# Patient Record
Sex: MALE | Race: BLACK OR AFRICAN AMERICAN | NOT HISPANIC OR LATINO | URBAN - METROPOLITAN AREA
[De-identification: names, ages, dates, MRNs, and addresses within clinical notes are randomized per-mention and may not be internally consistent; named-entity substitution may affect disease eponyms.]

---

## 2023-04-05 ENCOUNTER — HOSPITAL ENCOUNTER (EMERGENCY)
Facility: OTHER | Age: 23
Discharge: HOME | End: 2023-04-05
Attending: EMERGENCY MEDICINE
Payer: MEDICAID

## 2023-04-05 VITALS
SYSTOLIC BLOOD PRESSURE: 118 MMHG | HEART RATE: 85 BPM | RESPIRATION RATE: 16 BRPM | HEIGHT: 72 IN | WEIGHT: 225 LBS | DIASTOLIC BLOOD PRESSURE: 56 MMHG | TEMPERATURE: 99 F | BODY MASS INDEX: 30.48 KG/M2 | OXYGEN SATURATION: 98 %

## 2023-04-05 DIAGNOSIS — S39.012A STRAIN OF LUMBAR REGION, INITIAL ENCOUNTER: Primary | ICD-10-CM

## 2023-04-05 PROCEDURE — 99284 EMERGENCY DEPT VISIT MOD MDM: CPT

## 2023-04-05 PROCEDURE — 63600175 PHARM REV CODE 636 W HCPCS: Performed by: EMERGENCY MEDICINE

## 2023-04-05 PROCEDURE — 96372 THER/PROPH/DIAG INJ SC/IM: CPT | Performed by: EMERGENCY MEDICINE

## 2023-04-05 RX ORDER — NAPROXEN 500 MG/1
500 TABLET ORAL 2 TIMES DAILY WITH MEALS
Qty: 60 TABLET | Refills: 0 | Status: SHIPPED | OUTPATIENT
Start: 2023-04-05

## 2023-04-05 RX ORDER — KETOROLAC TROMETHAMINE 30 MG/ML
30 INJECTION, SOLUTION INTRAMUSCULAR; INTRAVENOUS
Status: COMPLETED | OUTPATIENT
Start: 2023-04-05 | End: 2023-04-05

## 2023-04-05 RX ORDER — ORPHENADRINE CITRATE 30 MG/ML
60 INJECTION INTRAMUSCULAR; INTRAVENOUS
Status: COMPLETED | OUTPATIENT
Start: 2023-04-05 | End: 2023-04-05

## 2023-04-05 RX ORDER — CYCLOBENZAPRINE HCL 10 MG
10 TABLET ORAL 3 TIMES DAILY PRN
Qty: 15 TABLET | Refills: 0 | Status: SHIPPED | OUTPATIENT
Start: 2023-04-05 | End: 2023-04-10

## 2023-04-05 RX ORDER — DEXAMETHASONE SODIUM PHOSPHATE 4 MG/ML
8 INJECTION, SOLUTION INTRA-ARTICULAR; INTRALESIONAL; INTRAMUSCULAR; INTRAVENOUS; SOFT TISSUE
Status: COMPLETED | OUTPATIENT
Start: 2023-04-05 | End: 2023-04-05

## 2023-04-05 RX ORDER — METHYLPREDNISOLONE 4 MG/1
TABLET ORAL
Qty: 1 TABLET | Refills: 0 | Status: SHIPPED | OUTPATIENT
Start: 2023-04-05 | End: 2023-04-26

## 2023-04-05 RX ADMIN — DEXAMETHASONE SODIUM PHOSPHATE 8 MG: 4 INJECTION, SOLUTION INTRA-ARTICULAR; INTRALESIONAL; INTRAMUSCULAR; INTRAVENOUS; SOFT TISSUE at 09:04

## 2023-04-05 RX ADMIN — KETOROLAC TROMETHAMINE 30 MG: 30 INJECTION, SOLUTION INTRAMUSCULAR; INTRAVENOUS at 09:04

## 2023-04-05 RX ADMIN — ORPHENADRINE CITRATE 60 MG: 30 INJECTION INTRAMUSCULAR; INTRAVENOUS at 09:04

## 2023-04-06 NOTE — ED PROVIDER NOTES
"Encounter Date: 4/5/2023       History     Chief Complaint   Patient presents with    Back Pain     C/o lower back pain after playing basketball, states "I think I twisted it the wrong way"     HPI    23-year-old male with no reported past medical history presenting lower back pain.  Patient was playing basketball earlier this afternoon, states that he was following and moved to the side felt an immediate pain and tightness in his lower back.  He reports since stopping earlier today his back has continued to worsen and become more and has had increased amount of pain.  He reports no numbness or tingling, states he can still walk but is having some difficulty, denies any bowel or bladder incontinence.  Reports having a history of lower back pain, states that he recently had some weight gain been less active than normally has been.  He also reports issues with the stretching said he is not very flexible.  He denies any further associated complaints.    Review of patient's allergies indicates:   Allergen Reactions    Shellfish containing products Swelling     History reviewed. No pertinent past medical history.  Past Surgical History:   Procedure Laterality Date    APPENDECTOMY      FOOT SURGERY      HAND SURGERY Right      History reviewed. No pertinent family history.  Social History     Tobacco Use    Smoking status: Never    Smokeless tobacco: Never   Substance Use Topics    Alcohol use: Yes     Review of Systems   Constitutional: Negative.    HENT: Negative.     Eyes: Negative.    Respiratory: Negative.     Cardiovascular: Negative.    Gastrointestinal: Negative.    Genitourinary: Negative.    Musculoskeletal:  Positive for back pain.   Skin: Negative.    Neurological: Negative.      Physical Exam     Initial Vitals [04/05/23 2109]   BP Pulse Resp Temp SpO2   (!) 118/56 85 16 99.2 °F (37.3 °C) 98 %      MAP       --         Physical Exam    Nursing note and vitals reviewed.  Constitutional: He appears " well-developed and well-nourished. He is not diaphoretic. No distress.   HENT:   Head: Normocephalic and atraumatic.   Eyes: Pupils are equal, round, and reactive to light. Right eye exhibits no discharge. Left eye exhibits no discharge.   Neck: No tracheal deviation present.   Normal range of motion.  Cardiovascular:  Normal rate and regular rhythm.           Pulmonary/Chest: No stridor. No respiratory distress.   Musculoskeletal:         General: Tenderness (ttp over bilateral lower para-lumbar spinal muscular areas, stable gait) present. No edema. Normal range of motion.      Cervical back: Normal range of motion.     Neurological: He is alert and oriented to person, place, and time. He has normal strength.   Full strength in BLE   Skin: Skin is warm and dry.       ED Course   Procedures  Labs Reviewed - No data to display       Imaging Results    None          Medications   dexAMETHasone injection 8 mg (8 mg Intramuscular Given 4/5/23 2145)   ketorolac injection 30 mg (30 mg Intramuscular Given 4/5/23 2145)   orphenadrine injection 60 mg (60 mg Intramuscular Given 4/5/23 2145)                      MDM:    23-year-old male with no reported past medical history presenting lower back pain.  Physical exam under indicated.  Patient presentation consistent with lower back muscular strain with exam findings consistent and red flags currently.  At this point in time based on physical exam evaluation do not suspect acute disc herniation, spinal epidural abscess, conus medullaris, cauda equina, diskitis, fracture, or any further surgical or medical emergency. Discussed diagnosis and further treatment with patient, including f/u.  Return precautions given and all questions answered.  Patient in understanding of plan.  Pt discharged to home improved and stable.              Clinical Impression:   Final diagnoses:  [S39.012A] Strain of lumbar region, initial encounter (Primary)        ED Disposition Condition    Discharge  Stable          ED Prescriptions       Medication Sig Dispense Start Date End Date Auth. Provider    methylPREDNISolone (MEDROL DOSEPACK) 4 mg tablet Taper as directed 1 tablet 4/5/2023 4/26/2023 Cheo Rahman MD    naproxen (NAPROSYN) 500 MG tablet Take 1 tablet (500 mg total) by mouth 2 (two) times daily with meals. 60 tablet 4/5/2023 -- Cheo Rahman MD    cyclobenzaprine (FLEXERIL) 10 MG tablet Take 1 tablet (10 mg total) by mouth 3 (three) times daily as needed for Muscle spasms. 15 tablet 4/5/2023 4/10/2023 Cheo Rahman MD          Follow-up Information       Follow up With Specialties Details Why Contact Info    [facility] - Emergency Dept Emergency Medicine Go to  If symptoms worsen [address]  [facility] [id number] [phone]             Cheo Rahman MD  04/08/23 9221

## 2024-05-28 ENCOUNTER — HOSPITAL ENCOUNTER (EMERGENCY)
Facility: OTHER | Age: 24
Discharge: HOME | End: 2024-05-28
Attending: EMERGENCY MEDICINE
Payer: MEDICAID

## 2024-05-28 VITALS
RESPIRATION RATE: 18 BRPM | DIASTOLIC BLOOD PRESSURE: 62 MMHG | WEIGHT: 225 LBS | HEIGHT: 72 IN | HEART RATE: 99 BPM | SYSTOLIC BLOOD PRESSURE: 118 MMHG | BODY MASS INDEX: 30.48 KG/M2 | OXYGEN SATURATION: 96 % | TEMPERATURE: 99 F

## 2024-05-28 DIAGNOSIS — M25.551 RIGHT HIP PAIN: Primary | ICD-10-CM

## 2024-05-28 PROCEDURE — 25000003 PHARM REV CODE 250: Performed by: EMERGENCY MEDICINE

## 2024-05-28 PROCEDURE — 99284 EMERGENCY DEPT VISIT MOD MDM: CPT

## 2024-05-28 RX ORDER — NAPROXEN 500 MG/1
500 TABLET ORAL
Status: COMPLETED | OUTPATIENT
Start: 2024-05-28 | End: 2024-05-28

## 2024-05-28 RX ORDER — METHOCARBAMOL 750 MG/1
750 TABLET, FILM COATED ORAL
Status: COMPLETED | OUTPATIENT
Start: 2024-05-28 | End: 2024-05-28

## 2024-05-28 RX ORDER — LIDOCAINE 50 MG/G
PATCH TOPICAL
Qty: 15 PATCH | Refills: 1 | Status: SHIPPED | OUTPATIENT
Start: 2024-05-28

## 2024-05-28 RX ORDER — METHOCARBAMOL 750 MG/1
1500 TABLET, FILM COATED ORAL 3 TIMES DAILY PRN
Qty: 30 TABLET | Refills: 0 | Status: SHIPPED | OUTPATIENT
Start: 2024-05-28 | End: 2024-06-02

## 2024-05-28 RX ORDER — DICLOFENAC SODIUM 10 MG/G
2 GEL TOPICAL 3 TIMES DAILY PRN
Qty: 100 G | Refills: 0 | Status: SHIPPED | OUTPATIENT
Start: 2024-05-28

## 2024-05-28 RX ORDER — NAPROXEN 500 MG/1
500 TABLET ORAL 2 TIMES DAILY PRN
Qty: 28 TABLET | Refills: 0 | Status: SHIPPED | OUTPATIENT
Start: 2024-05-28 | End: 2024-06-11

## 2024-05-28 RX ADMIN — NAPROXEN 500 MG: 500 TABLET ORAL at 02:05

## 2024-05-28 RX ADMIN — METHOCARBAMOL 750 MG: 750 TABLET ORAL at 02:05

## 2024-05-28 NOTE — ED NOTES
Bed: HOLT 01  Expected date:   Expected time:   Means of arrival: Personal Transportation  Comments:

## 2024-05-28 NOTE — FIRST PROVIDER EVALUATION
Emergency Department TeleTriage Encounter Note      CHIEF COMPLAINT    Chief Complaint   Patient presents with    Hip Pain     Right sided hip pain in groin area x 3 days. Unsure if he injured it or not.        VITAL SIGNS   Initial Vitals [05/28/24 1337]   BP Pulse Resp Temp SpO2   118/62 99 18 98.7 °F (37.1 °C) 96 %      MAP       --            ALLERGIES    Review of patient's allergies indicates:   Allergen Reactions    Shellfish containing products Swelling       PROVIDER TRIAGE NOTE  TeleTriage Note: Dhruv Zurita, a nontoxic/well appearing, 24 y.o. male, presented to the ED with c/o right sided groin pain that began 3 days ago. Denies urinary symptoms or penile discharge. Denies heavy lifting.     All ED beds are full at present; patient notified of this status.  Patient seen and medically screened by Nurse Practitioner via teletriage. Orders initiated at triage to expedite care.  Patient is stable to return to the waiting room and will be placed in an ED bed when available.  Care will be transferred to an alternate provider when patient has been placed in an Exam Room from the [facility] for physical exam, additional orders, and disposition.  1:40 PM Alysha Mcclellan DNP, FNP-C        ORDERS  Labs Reviewed - No data to display    ED Orders (720h ago, onward)      None              Virtual Visit Note: The provider triage portion of this emergency department evaluation and documentation was performed via [employer], a HIPAA-compliant telemedicine application, in concert with a tele-presenter in the room. A face to face patient evaluation with one of my colleagues will occur once the patient is placed in an emergency department room.      DISCLAIMER: This note was prepared with [employer] voice recognition transcription software. Garbled syntax, mangled pronouns, and other bizarre constructions may be attributed to that software system.

## 2024-05-28 NOTE — DISCHARGE INSTRUCTIONS

## 2024-05-28 NOTE — ED PROVIDER NOTES
"  Source of History:  Medical record, patient, pt's girlfriend.       Chief complaint:  Per triage note: "Hip Pain (Right sided hip pain in groin area x 3 days. Feels like "hip flexor is strained". Unsure if he injured it or not. )  "    HPI:    Patient presents with complaints of right hip pain beginning 3 days ago. Patient reports he believes that he strained himself while excising and notes that he noticed it for the first time after sitting and playing his instrument 3 nights ago. Patient states that the pain is often worsening whenever he has been sitting or resting for extended periods of time and then tries to move, or when he is lying flat and tries to sit up. Patient denies any attempt to alleviate pain, any trauma, dysuria, or fever.    ROS:   See HPI for pertinent Review of Systems      Review of patient's allergies indicates:   Allergen Reactions    Shellfish containing products Swelling       PMH:  As per HPI and below:  History reviewed. No pertinent past medical history.    Past Surgical History:   Procedure Laterality Date    APPENDECTOMY      FOOT SURGERY      HAND SURGERY Right        Social History     Tobacco Use    Smoking status: Never    Smokeless tobacco: Never   Substance Use Topics    Alcohol use: Yes       Physical Exam:      Nursing note and vitals reviewed.  /62   Pulse 99   Temp 98.7 °F (37.1 °C) (Oral)   Resp 18   Ht 6' (1.829 m)   Wt 102.1 kg (225 lb)   SpO2 96%   BMI 30.52 kg/m²     Constitutional: AAOx3. Well appearing.   Eyes: EOMI. No discharge. Anicteric.  HENT:   Mouth/Throat:    Neck: Normal range of motion. Neck supple.    Cardiovascular: Normal rate  Pulmonary/Chest: No respiratory distress.   Abdominal: No distension   Musculoskeletal: Normal range of motion.   Right hip, knee FROM. Hip flexion reproduces his pain. Tenderness over inguinal crease.   Neurological: GCS15. Alert and oriented to person, place, and time. No gross cranial nerve II-XII, focal strength, " or light touch deficit. Steady gait.   Skin: Skin is warm and dry.     Medical Decision Making / Independent Interpretations / External Records Reviewed:      ED Course as of 05/29/24 0929   Tue May 28, 2024   1401 Patient is a 24-year-old male  who presents for evaluation of atraumatic right hip pain he attributes to working out the day prior.  Pain is worse with prolonged standing, sitting.  He first noted the pain towards the end of a performance.  No focal deficits.  Denies any urinary symptoms.   Pt has tenderness over inguinal crease, hip flexion reproduces his pain. He has no bony joint tenderness  Urinalysis was ordered at triage.  Patient has no urinary complaints.  I doubt fracture, dislocation.   Plan is RICE therapy, PCP f/u, PT f/u. Pt to f/u with ortho if symptoms persist or worsen.     --  I discussed with pt and/or guardian/caretaker that this evaluation in the ED does not suggest any emergent or life threatening medical condition requiring admission or further immediate intervention or diagnostics. Regardless, an unremarkable evaluation in the ED does not preclude the development or presence of a serious or life threatening condition. Pt was instructed to return for any worsening, new, changed, or concerning symptoms.     I had a detailed discussion with patient and/or guardian/caretaker regarding findings, plan, return precautions, importance of medication adherence, need to follow-up with a PCP and specialist. All questions answered.     Note was created using voice recognition software. It may have occasional typographical errors not identified and edited despite initial review prior to signing.   [RC]      ED Course User Index  [RC] Jerrod Oneal MD       I decided to obtain the patient's medical records. I reviewed patient's prior external notes / results: external hospital emergency department encounter.     Additional Medical Decision Making: Prescription drug  management    Medications   methocarbamoL tablet 750 mg (750 mg Oral Given 5/28/24 1400)   naproxen tablet 500 mg (500 mg Oral Given 5/28/24 1400)              Diagnostic Impression:    1. Right hip pain         ED Disposition Condition    Discharge           No future appointments.   ED Prescriptions       Medication Sig Dispense Start Date End Date Auth. Provider    naproxen (NAPROSYN) 500 MG tablet Take 1 tablet (500 mg total) by mouth 2 (two) times daily as needed (pain). 28 tablet 5/28/2024 6/11/2024 Jerrod Oneal MD    LIDOcaine (LIDODERM) 5 % Apply to affected area. Use as directed. May use 4% lidocaine patch as alternative. 15 patch 5/28/2024 -- Jerrod Oneal MD    diclofenac sodium (VOLTAREN) 1 % Gel Apply 2 g topically 3 (three) times daily as needed (muscle spasm pain). Apply 2 grams to affected area 3 times daily as needed 100 g 5/28/2024 -- Jerrod Oneal MD    methocarbamoL (ROBAXIN) 750 MG Tab Take 2 tablets (1,500 mg total) by mouth 3 (three) times daily as needed (Muscle spasm pain). 30 tablet 5/28/2024 6/2/2024 Jerrod Oneal MD          Follow-up Information       Follow up With Specialties Details Why Contact Info    Your primary care doctor  In 1 week For recheck with your primary care doctor     [facility]  Call in 1 day To start seeing a primary care doctor, if you do not have one [address] [facility] [address]  [phone]      [facility] Internal Medicine, Family Medicine Schedule an appointment as soon as possible for a visit  To start seeing a primary care doctor, if you do not have a Primary doctor [address] [facility] [address]  [phone]              ---  INoreen, scribed for, and in the presence of, Dr. Oneal. I performed the scribed service and the documentation accurately describes the services I performed. I attest to the accuracy of the note.     Physician Attestation for Scribe:   Jerrod PANG  MD Jm, reviewed documentation as scribed in my presence, which is both accurate and complete.      Jerrod Oneal MD  05/29/24 7946

## 2024-05-28 NOTE — ED TRIAGE NOTES
Right hip pain x 2 days, worse with movement. Denies injury but states he may have pulled a muscle while exercising. Denies taking OTC meds for pain relief. Denies urinary symptoms. Presents in no distress.